# Patient Record
Sex: MALE | Race: WHITE | NOT HISPANIC OR LATINO | Employment: PART TIME | URBAN - METROPOLITAN AREA
[De-identification: names, ages, dates, MRNs, and addresses within clinical notes are randomized per-mention and may not be internally consistent; named-entity substitution may affect disease eponyms.]

---

## 2022-04-14 ENCOUNTER — OFFICE VISIT (OUTPATIENT)
Dept: INTERNAL MEDICINE | Facility: CLINIC | Age: 27
End: 2022-04-14
Payer: COMMERCIAL

## 2022-04-14 VITALS
DIASTOLIC BLOOD PRESSURE: 76 MMHG | WEIGHT: 179 LBS | HEIGHT: 68 IN | SYSTOLIC BLOOD PRESSURE: 112 MMHG | BODY MASS INDEX: 27.13 KG/M2 | HEART RATE: 73 BPM | OXYGEN SATURATION: 97 %

## 2022-04-14 DIAGNOSIS — H93.13 TINNITUS, BILATERAL: Primary | ICD-10-CM

## 2022-04-14 PROCEDURE — 99203 OFFICE O/P NEW LOW 30 MIN: CPT | Performed by: INTERNAL MEDICINE

## 2022-04-14 RX ORDER — IBUPROFEN 200 MG
200-400 TABLET ORAL
COMMUNITY

## 2022-04-14 NOTE — PROGRESS NOTES
"    Assessment & Plan     (H93.13) Tinnitus, bilateral  (primary encounter diagnosis)  Comment: he feels this is pulsatile for a number of years though intermittent only. No associate red flag and normal exam today  Plan: I think it would be reasonable to follow for now as benign causes at his age are by far more common. Reasons for prompt return to clinic or other acute care location were discussed with patient who voiced understanding.     Suspected subcutaneous cyst, right lower/anterior rib margin--reassurance given.     BMI:   Estimated body mass index is 27.22 kg/m  as calculated from the following:    Height as of this encounter: 1.727 m (5' 8\").    Weight as of this encounter: 81.2 kg (179 lb).   Weight management plan: Discussed healthy diet and exercise guidelines    Regular exercise    No follow-ups on file.    Robin Garcia MD  [facility]   Gareth Isabel is a 47 year old who presents for the following health issues   HPI   Pt is here to review two concerns  Some pulsatile type sounds in his ears on occasion when he is laying down. This is not new as it has been notable for a few years at least. Just wanted to get this checked out. No headache, hearing loss, dizziness or recent stroke like sx. Vision has been normal. No FH of early CVA/aneurysm mentioned.      Also has noted a small lump by his lower right ribcage. Denies pain or progressive growth.    Objective    /76 (BP Location: Left arm, Patient Position: Sitting, Cuff Size: Adult Large)   Pulse 73   Ht 1.727 m (5' 8\")   Wt 81.2 kg (179 lb)   SpO2 97%   BMI 27.22 kg/m     Physical Exam   Gen:nad  Neuro:nonfocal  HEENT:normal canal and TMs  Neck:no lad or bruits noted.  Right lower ribcage--cystic like mass in subcutaneous layer overlaying the lower margin.        Answers for HPI/ROS submitted by the patient on 4/14/2022  How many servings of fruits and vegetables do you eat daily?: " 2-3  On average, how many sweetened beverages do you drink each day (Examples: soda, juice, sweet tea, etc.  Do NOT count diet or artificially sweetened beverages)?: 0  How many minutes a day do you exercise enough to make your heart beat faster?: 30 to 60  How many days a week do you exercise enough to make your heart beat faster?: 4  How many days per week do you miss taking your medication?: 0  What is the reason for your visit today?: Lump on side / pulsatile tinnitus  When did your symptoms begin?: More than a month  How would you describe these symptoms?: Mild  Are your symptoms:: Staying the same  Have you had these symptoms before?: Yes  Have you tried or received treatment for these symptoms before?: No

## 2022-04-14 NOTE — PROGRESS NOTES
Answers for HPI/ROS submitted by the patient on 4/14/2022  How many servings of fruits and vegetables do you eat daily?: 2-3  On average, how many sweetened beverages do you drink each day (Examples: soda, juice, sweet tea, etc.  Do NOT count diet or artificially sweetened beverages)?: 0  How many minutes a day do you exercise enough to make your heart beat faster?: 30 to 60  How many days a week do you exercise enough to make your heart beat faster?: 4  How many days per week do you miss taking your medication?: 0  What is the reason for your visit today?: Lump on side / pulsatile tinnitus  When did your symptoms begin?: More than a month  How would you describe these symptoms?: Mild  Are your symptoms:: Staying the same  Have you had these symptoms before?: Yes  Have you tried or received treatment for these symptoms before?: No